# Patient Record
Sex: FEMALE
[De-identification: names, ages, dates, MRNs, and addresses within clinical notes are randomized per-mention and may not be internally consistent; named-entity substitution may affect disease eponyms.]

---

## 2022-11-23 ENCOUNTER — NURSE TRIAGE (OUTPATIENT)
Dept: OTHER | Facility: CLINIC | Age: 57
End: 2022-11-23

## 2022-11-23 NOTE — TELEPHONE ENCOUNTER
Location of patient: [place]    Subjective: Caller states \"Last night right before bed I accidentally hit my foot on the corner of the wall with my big toe. It immediately started bleeding. My toenail appears kind of popped out front he bag. We wrapped it and I elevated and put ice on it. Today, the toenail is still there. It's still bleeding a little bit. But it's swollen. I don't know if I should go to Urgent Care. \"     Current Symptoms: swelling at base of right great toenail, painful, intermittent bleeding, slight black/blue discoloration    Toe Injury    Onset: 1 day ago; unchanged    Associated Symptoms: reduced activity    Pain Severity: 5/10; pressure; intermittent with walking    Temperature: Denies    What has been tried: wrapped with bandage, ice, elevating, washing out    LMP: NA Pregnant: NA    Recommended disposition: Go to ED/[facility] Now (Or to Office with PCP Approval). Patient agreeable and will call PCP Now. Writer advised patient to be seen at [facility]/ED within 4 hours if unable to get appointment with PCP. Patient verbalized understanding. Care advice provided, patient verbalizes understanding; denies any other questions or concerns; instructed to call back for any new or worsening symptoms. Patient/caller agrees to follow-up with PCP     This triage is a result of a call to [address] [place] [address]. Please do not respond to the triage nurse through this encounter. Any subsequent communication should be directly with the patient. Reason for Disposition   Looks like a broken bone or dislocated joint (e.g., crooked or deformed)    Protocols used:  Toe Injury-ADULT-[place]